# Patient Record
Sex: MALE | NOT HISPANIC OR LATINO | URBAN - METROPOLITAN AREA
[De-identification: names, ages, dates, MRNs, and addresses within clinical notes are randomized per-mention and may not be internally consistent; named-entity substitution may affect disease eponyms.]

---

## 2018-03-20 ENCOUNTER — OFFICE VISIT CONVERTED (OUTPATIENT)
Dept: SURGERY | Facility: CLINIC | Age: 63
End: 2018-03-20
Attending: SURGERY

## 2020-09-17 ENCOUNTER — CONVERSION ENCOUNTER (OUTPATIENT)
Dept: CARDIOLOGY | Facility: CLINIC | Age: 65
End: 2020-09-17
Attending: INTERNAL MEDICINE

## 2020-09-17 ENCOUNTER — OFFICE VISIT CONVERTED (OUTPATIENT)
Dept: CARDIOLOGY | Facility: CLINIC | Age: 65
End: 2020-09-17
Attending: INTERNAL MEDICINE

## 2020-09-25 ENCOUNTER — CONVERSION ENCOUNTER (OUTPATIENT)
Dept: CARDIOLOGY | Facility: CLINIC | Age: 65
End: 2020-09-25
Attending: INTERNAL MEDICINE

## 2021-05-10 NOTE — PROCEDURES
"   Procedure Note      Patient Name: Hood Pineda   Patient ID: [id number]   Sex: Male   YOB: 1955    Primary Care Provider: Kenney MAC   Referring Provider: Kenney MAC    Visit Date: September 25, 2020    Provider: Saeid Mcginnis MD   Location: [facility]   Location Address: [address] [place] [address], [address]   [place]  [id number]   Location Phone: [phone]          FINAL REPORT   TRANSTHORACIC ECHOCARDIOGRAM REPORT    Diagnosis: Abnormal EKG   Height: 5'10\" Weight: 202 B/P: 122/78 BSA: 2.1   Tech: JTW   MEASUREMENTS:  RVID (Diastole) : RVID. (NORMAL: 0.7 to 2.4 cm max)   LVID (Systole): 3. cm (Diastole): 4.7 cm. (NORMAL: 3.7 - 5.4 cm)   Posterior Wall Thickness (Diastole): 0.9 cm. (NORMAL: 0.8 - 1.1 cm)   Septal Thickness (Diastole): 0.8 cm. (NORMAL: 0.7 - 1.2 cm)   LAID (Systole): 3.2 cm. (NORMAL: 1.9 - 3.8 cm)   Aortic Root Diameter (Diastole): 3.2 cm. (NORMAL: 2.0 - 3.7 cm)   DOPPLER: Continuous-wave, pulse-wave, and color-flow examination of the mitral, aortic, and tricuspid valves was performed. There was trace mitral regurgitation. No significant stenosis was identified. Doppler flow velocities were normal. E/A ratio is 0.7. DT= 275 msec. IVRT is 92 msec. E/E' is 8.   COMMENTS:  The patient underwent 2-D, M-Mode, and Doppler examination, including pulse-wave, continuous-wave, and color-flow analysis; the study is technically adequate.   FINDINGS:  AORTIC VALVE: Appeared to be normal. Trileaflet with central closure point. No evidence of any obstruction. No regurgitation.   MITRAL VALVE: Appeared to be normal. No evidence of any obstruction. No regurgitation.   TRICUSPID VALVE: Appeared to be normal.   PULMONIC VALVE: Not well seen.   LEFT ATRIUM: Appeared to be normal. No intracavity masses or clots seen. LA volume index is 14 mL/m2.   AORTIC ROOT: Appeared to be normal in size.   LEFT VENTRICLE: Appeared to have an overall normal left ventricular systolic function with " a normal EF of 55%. No obvious wall motion abnormalities.   RIGHT ATRIUM: Appeared to be normal; no obvious evidence of intracavity masses or clots.   RIGHT VENTRICLE: Normal size and function.   PERICARDIUM: Unremarkable. No evidence of effusion.   INFERIOR VENA CAVA: Diameter is 0.9 cm.   Fax: 10/05/2020      CONCLUSION:  1.  Normal ejection fraction of 55%.   2.  Trace mitral regurgitation.       MD TABITHA Hitchcock/bimal    This note was transcribed by Mare Godinez.  bimal/tabitha  The above service was transcribed by Mare Godinez, and I attest to the accuracy of the note.                   Electronically Signed by: Heike Godinez-, Other -Author on October 5, 2020 11:15:43 AM  Electronically Co-signed by: Saeid Mcginnis MD -Reviewer on October 11, 2020 05:29:06 PM

## 2021-05-10 NOTE — PROCEDURES
Procedure Note      Patient Name: Hood Pineda   Patient ID: [id number]   Sex: Male   YOB: 1955    Primary Care Provider: Kenney MAC   Referring Provider: Kenney MAC    Visit Date: September 17, 2020    Provider: Saeid Mcginnis MD   Location: [facility]   Location Address: [address] [place] [address], [facility]   [place]  [id number]   Location Phone: [phone]          FINAL REPORT   HOLTER MONITOR REPORT  Date: 09/17/2020   Indication: Palpitations      The patient underwent a 24-hour Holter monitor.  The average heart rate was 65 beats per minute.  The maximum heart rate was 101 beats per minute.  The minimum heart rate was 54 beats per minute.  There were occasional episodes of PVCs totaling 37 beats.     IMPRESSION:     1.  Normal average heart rate of 65 beats per minute based on normal sinus rhythm.   2.  There were 37 episodes of PVCs recorded.       MD TABITHA Hitchcock/bimal    This note was transcribed by Mare Godinez.  pap/tabitha  The above service was transcribed by Mare Godinez, and I attest to the accuracy of the note.  TABITHA                 Electronically Signed by: Heike Godinez-, Other -Author on October 5, 2020 11:18:59 AM  Electronically Co-signed by: Saeid Mcginnis MD -Reviewer on October 11, 2020 05:27:50 PM

## 2021-05-10 NOTE — H&P
History and Physical      Patient Name: Hood Pineda   Patient ID: [id number]   Sex: Male   YOB: 1955    Primary Care Provider: Kenney MAC   Referring Provider: Kenney MAC    Visit Date: September 17, 2020    Provider: Saeid Mcginnis MD   Location: [facility]   Location Address: [address] [place] [address], [address]   [place]  [id number]   Location Phone: [phone]          Chief Complaint     Fluttering.       History Of Present Illness  Consult requested by: Kenney MAC   Hood Pineda is a 65 year old /White male who has been having some intermittent flutter episodes. This has been going on for the last 2-3 years, but has increased in recurrence. The patient does not report any chest pain, shortness of breath, PND, or orthopnea. No syncope or presyncopal episodes.   PAST MEDICAL HISTORY: Previous hernia repair; Vasectomy; Corneal transplant.   FAMILY MEDICAL HISTORY: Significant for his dad who did have CHF in his 70's, but no premature coronary artery disease.   PSYCHOSOCIAL HISTORY: Denies tobacco use. Rare alcohol use. Daily caffeine intake. . Denies mood changes or depression.   CURRENT MEDICATIONS: Cosopt eye drops b.i.d.; Alphagan eye drops b.i.d.; fluorometholone eye drops daily.   ALLERGIES: No known drug allergies.       Review of Systems  · Constitutional  o Admits  o : good general health lately  o Denies  o : fatigue, recent weight changes   · Eyes  o Denies  o : double vision  · HENT  o Denies  o : hearing loss or ringing, chronic sinus problem, swollen glands in neck  · Cardiovascular  o Admits  o : palpitations (fast, fluttering, or skipping beats)  o Denies  o : chest pain, swelling (feet, ankles, hands), shortness of breath while walking or lying flat  · Respiratory  o Denies  o : asthma or wheezing, COPD  · Gastrointestinal  o Denies  o : ulcers, nausea or vomiting  · Neurologic  o Denies  o : lightheaded or dizzy, stroke,  "headaches  · Musculoskeletal  o Denies  o : joint pain, back pain  · Endocrine  o Denies  o : thyroid disease, diabetes, heat or cold intolerance, excessive thirst or urination  · Heme-Lymph  o Denies  o : bleeding or bruising tendency, anemia      Vitals  Date Time BP Position Site L\R Cuff Size HR RR TEMP (F) WT  HT  BMI kg/m2 BSA m2 O2 Sat HC       09/17/2020 01:31 /78 Sitting    64 - R   202lbs 0oz 5'  10\" 28.98 2.13           Physical Examination  · Constitutional  o Appearance  o : Awake, alert, in no acute distress.   · Head and Face  o HEENT  o : PERRLA.  · Eyes  o Conjunctivae  o : Normal.  · Ears, Nose, Mouth and Throat  o Oral Cavity  o :   § Oral Mucosa  § : Normal.  · Neck  o Inspection/Palpation  o : No JVD.  · Respiratory  o Respiratory  o : Chest is symmetrical. Lung fields are clear. No rhonchi or wheezes heard.  · Cardiovascular  o Heart  o :   § Auscultation of Heart  § : S1, S2 normal. Regular rate and rhythm without murmurs, gallops, or rubs.  o Peripheral Vascular System  o :   § Extremities  § : Nails normal. No clubbing or cyanosis. Femoral pulses adequate. Pedal pulses adequate. No peripheral edema.  · Gastrointestinal  o Abdominal Examination  o : Abdomen soft. No masses. No guarding or rigidity. No hepatosplenomegaly. Bowel sounds normal.  · Musculoskeletal  o General  o :   § General Musculoskeletal  § : Muscle tone and strength were normal.  · Skin and Subcutaneous Tissue  o General Inspection  o : Unremarkable.  · EKG  o EKG  o : Performed in the office today.  o Indications  o : Palpitations.   o Results  o : Normal sinus rhythm with borderline left ventricular hypertrophy.  o Comparison  o : No prior EKG to compare to.   · Labs  o Labs  o : Creatinine 1.0.          Assessment     ASSESSMENT & PLAN:    Heart palpitations and fluttering.  There is some concern of paroxysmal atrial fibrillation per the patient; although, currently, we cannot find any underlying documentation that " the patient has actually ever had any episodes of this.  Certainly, he is normal sinus rhythm at this point.  Suspect most likely etiology would just be benign PACs or PVCs.  Patient does have a relatively low-risk factor profile for paroxysmal atrial fibrillation and will be at low-risk for any CVAs, as well.  Recommended checking an echocardiogram, as well as a 24-hour Holter monitor.      Saeid Mcginnis MD  JH:             Electronically Signed by: Lis Baron-, Other -Author on September 23, 2020 06:30:23 AM  Electronically Co-signed by: Saeid Mcginnis MD -Reviewer on September 23, 2020 10:04:06 AM

## 2021-05-14 VITALS
BODY MASS INDEX: 28.92 KG/M2 | HEIGHT: 70 IN | WEIGHT: 202 LBS | HEART RATE: 64 BPM | DIASTOLIC BLOOD PRESSURE: 78 MMHG | SYSTOLIC BLOOD PRESSURE: 122 MMHG

## 2021-05-16 VITALS — WEIGHT: 190 LBS | RESPIRATION RATE: 16 BRPM | BODY MASS INDEX: 27.2 KG/M2 | HEIGHT: 70 IN
